# Patient Record
Sex: MALE | Race: WHITE | Employment: UNEMPLOYED | URBAN - METROPOLITAN AREA
[De-identification: names, ages, dates, MRNs, and addresses within clinical notes are randomized per-mention and may not be internally consistent; named-entity substitution may affect disease eponyms.]

---

## 2020-02-11 NOTE — PROGRESS NOTES
DR Daniel Limno called and discussed patients past cardiac history. Per Dr Daniel Limon , no labs or further testing needs completed prior to Dental Restoration surgery.

## 2020-02-14 ENCOUNTER — PREP FOR PROCEDURE (OUTPATIENT)
Dept: DENTISTRY | Age: 20
End: 2020-02-14

## 2020-02-14 RX ORDER — SODIUM CHLORIDE 0.9 % (FLUSH) 0.9 %
10 SYRINGE (ML) INJECTION PRN
Status: CANCELLED | OUTPATIENT
Start: 2020-02-14

## 2020-02-14 RX ORDER — SODIUM CHLORIDE 0.9 % (FLUSH) 0.9 %
10 SYRINGE (ML) INJECTION EVERY 12 HOURS SCHEDULED
Status: CANCELLED | OUTPATIENT
Start: 2020-02-14

## 2020-02-14 NOTE — H&P (VIEW-ONLY)
Dental History and Physical    Jailene Sav Joy  [facility] [address]    The patient is a 23 y.o. male with a history of down syndrome, congenital heart defect, and AV repairs    Chief Complaint: \"My son has cavities\"    History of present illness: Patient is due for routine dental care, including complete exam, prophylaxis, full-mouth probing, fluoride application, restorations, and extractions as indicated. Due to patient's inability to cooperate, during diagnosis and treatment planning appointment, treatment in the dental operatory is contraindicated. Patient was seen by Dr. Dionna Gomes on 01/28/2020 and was deemed at average risk for dental treatment under general anesthesia. Past Medical History:      Diagnosis Date    Congenital heart anomaly 2001    had surgical repair    Down syndrome        Past Surgical History:        Procedure Laterality Date    CARDIAC SURGERY      open heart at 1 mos old   [address] [facility]  1-9-13    dental extraction 4 septic teeth       Medications Prior to Admission:    Prior to Admission medications    Medication Sig Start Date End Date Taking? Authorizing Provider   Pediatric Multivit-Minerals-C (KIDS GUMMY BEAR VITAMINS) CHEW Take 1 capsule by mouth daily. Historical Provider, MD   Loratadine (CLARITIN PO) Take 10 mg by mouth as needed. Historical Provider, MD       Allergies:  Patient has no known allergies. Social History:   TOBACCO:   reports that he has never smoked. He has never used smokeless tobacco.  ETOH:   reports no history of alcohol use. OCCUPATION:  None reported    Family History:   No family history on file. REVIEW OF SYSTEMS:  Pertinent items are noted in HPI.     Labs and Imaging Studies   Basic Labs  CBC with Differential:  No results found for: WBC, RBC, HGB, HCT, PLT, MCV, MCH, MCHC, RDW, NRBC, SEGSPCT, BANDSPCT, BLASTSPCT, METASPCT, LYMPHOPCT, PROMYELOPCT, MONOPCT, MYELOPCT, EOSPCT, BASOPCT, MONOSABS, LYMPHSABS,

## 2020-02-20 ENCOUNTER — ANESTHESIA EVENT (OUTPATIENT)
Dept: OPERATING ROOM | Age: 20
End: 2020-02-20
Payer: COMMERCIAL

## 2020-02-21 ENCOUNTER — HOSPITAL ENCOUNTER (OUTPATIENT)
Age: 20
Setting detail: OUTPATIENT SURGERY
Discharge: HOME | End: 2020-02-21
Attending: DENTIST | Admitting: DENTIST
Payer: COMMERCIAL

## 2020-02-21 ENCOUNTER — ANESTHESIA (OUTPATIENT)
Dept: OPERATING ROOM | Age: 20
End: 2020-02-21
Payer: COMMERCIAL

## 2020-02-21 VITALS
TEMPERATURE: 97.2 F | RESPIRATION RATE: 4 BRPM | DIASTOLIC BLOOD PRESSURE: 57 MMHG | OXYGEN SATURATION: 100 % | SYSTOLIC BLOOD PRESSURE: 135 MMHG

## 2020-02-21 VITALS
SYSTOLIC BLOOD PRESSURE: 132 MMHG | TEMPERATURE: 99 F | DIASTOLIC BLOOD PRESSURE: 58 MMHG | HEART RATE: 105 BPM | HEIGHT: 65 IN | WEIGHT: 122 LBS | OXYGEN SATURATION: 97 % | BODY MASS INDEX: 20.33 KG/M2 | RESPIRATION RATE: 18 BRPM

## 2020-02-21 PROBLEM — K02.9 CARIES: Status: ACTIVE | Noted: 2020-02-21

## 2020-02-21 PROBLEM — K05.10 GINGIVITIS: Status: ACTIVE | Noted: 2020-02-21

## 2020-02-21 PROCEDURE — 7100000000 HC PACU RECOVERY - FIRST 15 MIN: Performed by: DENTIST

## 2020-02-21 PROCEDURE — 2580000003 HC RX 258: Performed by: STUDENT IN AN ORGANIZED HEALTH CARE EDUCATION/TRAINING PROGRAM

## 2020-02-21 PROCEDURE — 7100000010 HC PHASE II RECOVERY - FIRST 15 MIN: Performed by: DENTIST

## 2020-02-21 PROCEDURE — 3600000002 HC SURGERY LEVEL 2 BASE: Performed by: DENTIST

## 2020-02-21 PROCEDURE — 2580000003 HC RX 258

## 2020-02-21 PROCEDURE — 6360000002 HC RX W HCPCS: Performed by: NURSE ANESTHETIST, CERTIFIED REGISTERED

## 2020-02-21 PROCEDURE — 6360000002 HC RX W HCPCS

## 2020-02-21 PROCEDURE — 7100000001 HC PACU RECOVERY - ADDTL 15 MIN: Performed by: DENTIST

## 2020-02-21 PROCEDURE — 7100000011 HC PHASE II RECOVERY - ADDTL 15 MIN: Performed by: DENTIST

## 2020-02-21 PROCEDURE — 2500000003 HC RX 250 WO HCPCS: Performed by: NURSE ANESTHETIST, CERTIFIED REGISTERED

## 2020-02-21 PROCEDURE — 3700000000 HC ANESTHESIA ATTENDED CARE: Performed by: DENTIST

## 2020-02-21 PROCEDURE — 3600000012 HC SURGERY LEVEL 2 ADDTL 15MIN: Performed by: DENTIST

## 2020-02-21 PROCEDURE — 3700000001 HC ADD 15 MINUTES (ANESTHESIA): Performed by: DENTIST

## 2020-02-21 PROCEDURE — 2500000003 HC RX 250 WO HCPCS

## 2020-02-21 RX ORDER — MEPERIDINE HYDROCHLORIDE 50 MG/ML
12.5 INJECTION INTRAMUSCULAR; INTRAVENOUS; SUBCUTANEOUS
Status: DISCONTINUED | OUTPATIENT
Start: 2020-02-21 | End: 2020-02-21 | Stop reason: HOSPADM

## 2020-02-21 RX ORDER — GLYCOPYRROLATE 1 MG/5 ML
SYRINGE (ML) INTRAVENOUS PRN
Status: DISCONTINUED | OUTPATIENT
Start: 2020-02-21 | End: 2020-02-21 | Stop reason: SDUPTHER

## 2020-02-21 RX ORDER — NEOSTIGMINE METHYLSULFATE 1 MG/ML
INJECTION, SOLUTION INTRAVENOUS PRN
Status: DISCONTINUED | OUTPATIENT
Start: 2020-02-21 | End: 2020-02-21 | Stop reason: SDUPTHER

## 2020-02-21 RX ORDER — OXYCODONE HYDROCHLORIDE AND ACETAMINOPHEN 5; 325 MG/1; MG/1
1 TABLET ORAL PRN
Status: DISCONTINUED | OUTPATIENT
Start: 2020-02-21 | End: 2020-02-21 | Stop reason: HOSPADM

## 2020-02-21 RX ORDER — MIDAZOLAM HYDROCHLORIDE 1 MG/ML
INJECTION INTRAMUSCULAR; INTRAVENOUS PRN
Status: DISCONTINUED | OUTPATIENT
Start: 2020-02-21 | End: 2020-02-21 | Stop reason: SDUPTHER

## 2020-02-21 RX ORDER — FENTANYL CITRATE 50 UG/ML
INJECTION, SOLUTION INTRAMUSCULAR; INTRAVENOUS PRN
Status: DISCONTINUED | OUTPATIENT
Start: 2020-02-21 | End: 2020-02-21 | Stop reason: SDUPTHER

## 2020-02-21 RX ORDER — SODIUM CHLORIDE 0.9 % (FLUSH) 0.9 %
10 SYRINGE (ML) INJECTION PRN
Status: DISCONTINUED | OUTPATIENT
Start: 2020-02-21 | End: 2020-02-21 | Stop reason: HOSPADM

## 2020-02-21 RX ORDER — ONDANSETRON 2 MG/ML
4 INJECTION INTRAMUSCULAR; INTRAVENOUS
Status: DISCONTINUED | OUTPATIENT
Start: 2020-02-21 | End: 2020-02-21 | Stop reason: HOSPADM

## 2020-02-21 RX ORDER — OXYCODONE HYDROCHLORIDE AND ACETAMINOPHEN 5; 325 MG/1; MG/1
2 TABLET ORAL PRN
Status: DISCONTINUED | OUTPATIENT
Start: 2020-02-21 | End: 2020-02-21 | Stop reason: HOSPADM

## 2020-02-21 RX ORDER — ROCURONIUM BROMIDE 10 MG/ML
INJECTION, SOLUTION INTRAVENOUS PRN
Status: DISCONTINUED | OUTPATIENT
Start: 2020-02-21 | End: 2020-02-21 | Stop reason: SDUPTHER

## 2020-02-21 RX ORDER — SODIUM CHLORIDE 0.9 % (FLUSH) 0.9 %
10 SYRINGE (ML) INJECTION EVERY 12 HOURS SCHEDULED
Status: DISCONTINUED | OUTPATIENT
Start: 2020-02-21 | End: 2020-02-21 | Stop reason: HOSPADM

## 2020-02-21 RX ORDER — ONDANSETRON 2 MG/ML
INJECTION INTRAMUSCULAR; INTRAVENOUS PRN
Status: DISCONTINUED | OUTPATIENT
Start: 2020-02-21 | End: 2020-02-21 | Stop reason: SDUPTHER

## 2020-02-21 RX ORDER — SODIUM CHLORIDE 9 MG/ML
INJECTION, SOLUTION INTRAVENOUS CONTINUOUS PRN
Status: DISCONTINUED | OUTPATIENT
Start: 2020-02-21 | End: 2020-02-21 | Stop reason: SDUPTHER

## 2020-02-21 RX ORDER — MORPHINE SULFATE 2 MG/ML
1 INJECTION, SOLUTION INTRAMUSCULAR; INTRAVENOUS EVERY 5 MIN PRN
Status: DISCONTINUED | OUTPATIENT
Start: 2020-02-21 | End: 2020-02-21 | Stop reason: HOSPADM

## 2020-02-21 RX ORDER — IBUPROFEN 600 MG/1
600 TABLET ORAL EVERY 6 HOURS PRN
Qty: 360 TABLET | Refills: 0 | Status: SHIPPED | OUTPATIENT
Start: 2020-02-21 | End: 2020-02-24

## 2020-02-21 RX ORDER — PROPOFOL 10 MG/ML
INJECTION, EMULSION INTRAVENOUS PRN
Status: DISCONTINUED | OUTPATIENT
Start: 2020-02-21 | End: 2020-02-21 | Stop reason: SDUPTHER

## 2020-02-21 RX ORDER — DEXAMETHASONE SODIUM PHOSPHATE 10 MG/ML
INJECTION INTRAMUSCULAR; INTRAVENOUS PRN
Status: DISCONTINUED | OUTPATIENT
Start: 2020-02-21 | End: 2020-02-21 | Stop reason: SDUPTHER

## 2020-02-21 RX ORDER — MORPHINE SULFATE 2 MG/ML
2 INJECTION, SOLUTION INTRAMUSCULAR; INTRAVENOUS EVERY 5 MIN PRN
Status: DISCONTINUED | OUTPATIENT
Start: 2020-02-21 | End: 2020-02-21 | Stop reason: HOSPADM

## 2020-02-21 RX ORDER — LIDOCAINE HYDROCHLORIDE 20 MG/ML
INJECTION, SOLUTION INTRAVENOUS PRN
Status: DISCONTINUED | OUTPATIENT
Start: 2020-02-21 | End: 2020-02-21 | Stop reason: SDUPTHER

## 2020-02-21 RX ADMIN — PROPOFOL 200 MG: 10 INJECTION, EMULSION INTRAVENOUS at 08:05

## 2020-02-21 RX ADMIN — ROCURONIUM BROMIDE 10 MG: 10 INJECTION, SOLUTION INTRAVENOUS at 08:57

## 2020-02-21 RX ADMIN — DEXAMETHASONE SODIUM PHOSPHATE 10 MG: 10 INJECTION INTRAMUSCULAR; INTRAVENOUS at 08:05

## 2020-02-21 RX ADMIN — FENTANYL CITRATE 50 MCG: 50 INJECTION, SOLUTION INTRAMUSCULAR; INTRAVENOUS at 09:04

## 2020-02-21 RX ADMIN — Medication 1.5 MG: at 09:55

## 2020-02-21 RX ADMIN — SODIUM CHLORIDE, PRESERVATIVE FREE 10 ML: 5 INJECTION INTRAVENOUS at 07:32

## 2020-02-21 RX ADMIN — MIDAZOLAM 1 MG: 1 INJECTION INTRAMUSCULAR; INTRAVENOUS at 07:45

## 2020-02-21 RX ADMIN — ONDANSETRON HYDROCHLORIDE 4 MG: 2 INJECTION, SOLUTION INTRAMUSCULAR; INTRAVENOUS at 09:41

## 2020-02-21 RX ADMIN — LIDOCAINE HYDROCHLORIDE 100 MG: 20 INJECTION, SOLUTION INTRAVENOUS at 08:05

## 2020-02-21 RX ADMIN — SODIUM CHLORIDE: 9 INJECTION, SOLUTION INTRAVENOUS at 07:43

## 2020-02-21 RX ADMIN — MIDAZOLAM 1 MG: 1 INJECTION INTRAMUSCULAR; INTRAVENOUS at 07:40

## 2020-02-21 RX ADMIN — SODIUM CHLORIDE: 9 INJECTION, SOLUTION INTRAVENOUS at 08:33

## 2020-02-21 RX ADMIN — Medication 0.3 MG: at 09:55

## 2020-02-21 RX ADMIN — ROCURONIUM BROMIDE 40 MG: 10 INJECTION, SOLUTION INTRAVENOUS at 08:05

## 2020-02-21 RX ADMIN — FENTANYL CITRATE 50 MCG: 50 INJECTION, SOLUTION INTRAMUSCULAR; INTRAVENOUS at 08:05

## 2020-02-21 ASSESSMENT — PULMONARY FUNCTION TESTS
PIF_VALUE: 19
PIF_VALUE: 18
PIF_VALUE: 19
PIF_VALUE: 18
PIF_VALUE: 19
PIF_VALUE: 18
PIF_VALUE: 19
PIF_VALUE: 18
PIF_VALUE: 19
PIF_VALUE: 18
PIF_VALUE: 18
PIF_VALUE: 19
PIF_VALUE: 0
PIF_VALUE: 1
PIF_VALUE: 18
PIF_VALUE: 20
PIF_VALUE: 19
PIF_VALUE: 18
PIF_VALUE: 19
PIF_VALUE: 18
PIF_VALUE: 19
PIF_VALUE: 18
PIF_VALUE: 18
PIF_VALUE: 19
PIF_VALUE: 19
PIF_VALUE: 18
PIF_VALUE: 19
PIF_VALUE: 19
PIF_VALUE: 25
PIF_VALUE: 19
PIF_VALUE: 19
PIF_VALUE: 18
PIF_VALUE: 19
PIF_VALUE: 18
PIF_VALUE: 18
PIF_VALUE: 19
PIF_VALUE: 12
PIF_VALUE: 18
PIF_VALUE: 19
PIF_VALUE: 18
PIF_VALUE: 18
PIF_VALUE: 19
PIF_VALUE: 25
PIF_VALUE: 18
PIF_VALUE: 22
PIF_VALUE: 1
PIF_VALUE: 17
PIF_VALUE: 19
PIF_VALUE: 18
PIF_VALUE: 16
PIF_VALUE: 19
PIF_VALUE: 21
PIF_VALUE: 19
PIF_VALUE: 18
PIF_VALUE: 20
PIF_VALUE: 21
PIF_VALUE: 19
PIF_VALUE: 18
PIF_VALUE: 18
PIF_VALUE: 19
PIF_VALUE: 1
PIF_VALUE: 19
PIF_VALUE: 17
PIF_VALUE: 19
PIF_VALUE: 18
PIF_VALUE: 18
PIF_VALUE: 19
PIF_VALUE: 18
PIF_VALUE: 20
PIF_VALUE: 19
PIF_VALUE: 18
PIF_VALUE: 18
PIF_VALUE: 19
PIF_VALUE: 22
PIF_VALUE: 19
PIF_VALUE: 18
PIF_VALUE: 19
PIF_VALUE: 17
PIF_VALUE: 1
PIF_VALUE: 19
PIF_VALUE: 18
PIF_VALUE: 4
PIF_VALUE: 18
PIF_VALUE: 18
PIF_VALUE: 19
PIF_VALUE: 19
PIF_VALUE: 18
PIF_VALUE: 19
PIF_VALUE: 1
PIF_VALUE: 19
PIF_VALUE: 21
PIF_VALUE: 18
PIF_VALUE: 19
PIF_VALUE: 18
PIF_VALUE: 18
PIF_VALUE: 19
PIF_VALUE: 19
PIF_VALUE: 18
PIF_VALUE: 18
PIF_VALUE: 19
PIF_VALUE: 19
PIF_VALUE: 18
PIF_VALUE: 19
PIF_VALUE: 20
PIF_VALUE: 19
PIF_VALUE: 19
PIF_VALUE: 18
PIF_VALUE: 19
PIF_VALUE: 19
PIF_VALUE: 18
PIF_VALUE: 19

## 2020-02-21 ASSESSMENT — PAIN - FUNCTIONAL ASSESSMENT: PAIN_FUNCTIONAL_ASSESSMENT: 0-10

## 2020-02-21 ASSESSMENT — PAIN SCALES - WONG BAKER
WONGBAKER_NUMERICALRESPONSE: 0

## 2020-02-21 NOTE — PROGRESS NOTES
Discharge instructions given to patient's parents & they verbalized understanding. Jb & gauze sent home with patient. Patient sitting up dangling legs bedside getting dressed.

## 2020-02-21 NOTE — INTERVAL H&P NOTE
H&P Update    Patient's History and Physical from January 28, 2020 was reviewed. Patient examined. Patient was cleared for general anesthesia by Dr. Estelita Narvaez on 1/28/2020. NPO confirmed. There has been no change. Patient's mother and father were present. Discussed treatment plan. Patient's guardians consent to extractions, fillings (including amalgam/silver), silver diamine fluoride, radiographs, prophy and fluoride.        Electronically signed by Mikel Arce DDS on 2/21/20 at 7:16 AM    Electronically signed by Jessica Martini DDS on 2/21/2020 at 7:21 AM

## 2020-02-21 NOTE — ANESTHESIA POSTPROCEDURE EVALUATION
Department of Anesthesiology  Postprocedure Note    Patient: Margarita Hatch  MRN: [id number]  YOB: 2000  Date of evaluation: 2/21/2020  Time:  10:44 AM     Procedure Summary     Date:  02/21/20 Room / Location:  [facility] OR 12 / [facility]    Anesthesia Start:  9370 Anesthesia Stop:  1011    Procedure:  DENTAL X RAYS,RESTORATIONS (N/A Mouth) Diagnosis:  (DENTAL CARIES)    Surgeon:  Gust Nageotte, DDS Responsible Provider:  Gayla Hinton DO    Anesthesia Type:  general ASA Status:  2          Anesthesia Type: general    Nain Phase I: Nain Score: 10    Nain Phase II:      Last vitals: Reviewed and per EMR flowsheets.        Anesthesia Post Evaluation    Patient location during evaluation: PACU  Patient participation: complete - patient participated  Level of consciousness: awake and alert  Airway patency: patent  Nausea & Vomiting: no nausea and no vomiting  Complications: no  Cardiovascular status: hemodynamically stable  Respiratory status: acceptable  Hydration status: euvolemic

## 2020-02-21 NOTE — OP NOTE
Date of Procedure: 2/21/2020     Surgeon: Jennifer Barrett, DEBORAH Holcomb DMD     Surgical Wound Classification: Clean Contaminated II    Preoperative Diagnosis:  Dental caries, retained primary root tip #K    Postoperative Diagnosis: Dental caries, plaque induced gingivitis, retained primary root tip #K    Operation: Exam, Prophy, Fluoride Treatment, Restorative: #3 MO-C, #4 DO-C #14 MO-C, 18 MO-A (SDF), #19 MOD-A with surefill, #20 DO-A (SDF), #28 MOD-C, #29 DO-A (SDF), #30 MOD-A with surefill, #31 O-A (SDF). Extractions: retained primary root tip #K    Anesthesia: General, EOTT     Estimated Blood Loss: <5 mL     Complications:  None     Fluids: 800 mL    Specimen: none     Conditions:  good    Disposition: To PACU, stable    Procedure: This patient was initially seen in the preoperative holding area, where the history, physical and consents were updated and verified. The patient was then transferred via the anesthesia team and [facility] to operating room # 12 at [place] [address] on 2/21/2020 , at which time the patient was transferred from the [facility] onto the operating room table and placed in the supine position. The patient's arms and legs were padded at the sides. The patient had the general anesthetic monitors applied. Please see anesthesia notes for complete details. Once the patient was placed into a general anesthetic state, the surgical area was prepped and draped in a standard oral and maxillofacial surgery fashion. A throat pack was placed. A comprehensive oral exam was performed. 19 radiographs were taken. A treatment plan was formulated based upon presenting clinical and radiographic findings. Caries were identified, followed by the preparation of the following teeth:#3 MO, #4 DO, #14 MO, 18 MO, #19 MOD, #20 DO, #28 MOD, #29 DO, #30 MOD, 31 O-A.   Dental restorations were placed as follows: Composite: #3 MO, #4 DO, #14

## (undated) DEVICE — GLOVE SURG SZ 65 THK91MIL LTX FREE SYN POLYISOPRENE

## (undated) DEVICE — SPONGE LAP W3/8XL1.5IN COT CYL CNTR STRUNG N RADPQ STRL

## (undated) DEVICE — PACKING,VAGINAL,XR,ST,4"X36",100EA: Brand: MEDLINE

## (undated) DEVICE — MARKER,SKIN,WI/RULER AND LABELS: Brand: MEDLINE

## (undated) DEVICE — JELLY PETROLEUM 5GR FOIL PK

## (undated) DEVICE — GOWN,SIRUS,FABRNF,XL,20/CS: Brand: MEDLINE

## (undated) DEVICE — GOWN,SIRUS,FABRNF,L,20/CS: Brand: MEDLINE

## (undated) DEVICE — SURGICAL PROCEDURE PACK EENT CUST

## (undated) DEVICE — COUNTER NDL 30 COUNT DBL MAG

## (undated) DEVICE — SYRINGE,EAR/ULCER, 3 OZ, STERILE: Brand: MEDLINE

## (undated) DEVICE — YANKAUER,OPEN TIP,W/O VENT,STERILE: Brand: MEDLINE INDUSTRIES, INC.

## (undated) DEVICE — BASIC SINGLE BASIN 1-LF: Brand: MEDLINE INDUSTRIES, INC.

## (undated) DEVICE — TOWEL,OR,DSP,ST,BLUE,STD,6/PK,12PK/CS: Brand: MEDLINE

## (undated) DEVICE — COVER,LIGHT HANDLE,FLX,2/PK: Brand: MEDLINE INDUSTRIES, INC.

## (undated) DEVICE — TUBING SUCT 12FR MAL ALUM SHFT FN CAP VENT UNIV CONN W/ OBT

## (undated) DEVICE — SET PROPHY